# Patient Record
Sex: MALE | Employment: OTHER | URBAN - METROPOLITAN AREA
[De-identification: names, ages, dates, MRNs, and addresses within clinical notes are randomized per-mention and may not be internally consistent; named-entity substitution may affect disease eponyms.]

---

## 2020-01-29 ENCOUNTER — TELEPHONE (OUTPATIENT)
Dept: CARDIOLOGY | Facility: CLINIC | Age: 70
End: 2020-01-29

## 2020-01-29 NOTE — TELEPHONE ENCOUNTER
"Received message from Clinic  of referral to Dr. Mtz.  Spoke to patient directly.  He has a history of MI and multiple stent procedures both pre and post CABG.  Stated that he does not have symptoms when his stents are closed he "just has a heart attack".  His MD in [place], who in now retiring, will directly admit him and do cath.  Patient has copy of most recent angiogram on disc and will mail it to .  Once received and reviewed will contact patient to schedule consult.  Patient verbalized understanding.  Office mailing address and phone number provided.   "

## 2020-02-17 ENCOUNTER — OFFICE VISIT (OUTPATIENT)
Dept: CARDIOLOGY | Facility: CLINIC | Age: 70
End: 2020-02-17
Payer: MEDICARE

## 2020-02-17 VITALS
HEART RATE: 57 BPM | DIASTOLIC BLOOD PRESSURE: 65 MMHG | HEIGHT: 72 IN | BODY MASS INDEX: 25.08 KG/M2 | SYSTOLIC BLOOD PRESSURE: 139 MMHG | WEIGHT: 185.19 LBS

## 2020-02-17 DIAGNOSIS — Z95.5 HISTORY OF CORONARY ARTERY STENT PLACEMENT: ICD-10-CM

## 2020-02-17 DIAGNOSIS — Z95.1 S/P CABG (CORONARY ARTERY BYPASS GRAFT): ICD-10-CM

## 2020-02-17 DIAGNOSIS — E11.9 TYPE 2 DIABETES MELLITUS WITHOUT COMPLICATION, WITHOUT LONG-TERM CURRENT USE OF INSULIN: ICD-10-CM

## 2020-02-17 DIAGNOSIS — I10 ESSENTIAL HYPERTENSION: ICD-10-CM

## 2020-02-17 DIAGNOSIS — I25.10 CORONARY ARTERY DISEASE INVOLVING NATIVE CORONARY ARTERY OF NATIVE HEART WITHOUT ANGINA PECTORIS: Primary | ICD-10-CM

## 2020-02-17 DIAGNOSIS — E78.2 MIXED HYPERLIPIDEMIA: ICD-10-CM

## 2020-02-17 PROCEDURE — 99204 OFFICE O/P NEW MOD 45 MIN: CPT | Mod: S$PBB,,, | Performed by: INTERNAL MEDICINE

## 2020-02-17 PROCEDURE — 99204 PR OFFICE/OUTPT VISIT, NEW, LEVL IV, 45-59 MIN: ICD-10-PCS | Mod: S$PBB,,, | Performed by: INTERNAL MEDICINE

## 2020-02-17 PROCEDURE — 99999 PR PBB SHADOW E&M-EST. PATIENT-LVL III: CPT | Mod: PBBFAC,,, | Performed by: INTERNAL MEDICINE

## 2020-02-17 PROCEDURE — 99213 OFFICE O/P EST LOW 20 MIN: CPT | Mod: PBBFAC | Performed by: INTERNAL MEDICINE

## 2020-02-17 PROCEDURE — 99999 PR PBB SHADOW E&M-EST. PATIENT-LVL III: ICD-10-PCS | Mod: PBBFAC,,, | Performed by: INTERNAL MEDICINE

## 2020-02-17 RX ORDER — CHLORTHALIDONE 25 MG/1
25 TABLET ORAL DAILY
COMMUNITY

## 2020-02-17 RX ORDER — METOPROLOL SUCCINATE 25 MG/1
25 TABLET, EXTENDED RELEASE ORAL DAILY
COMMUNITY

## 2020-02-17 RX ORDER — OMEPRAZOLE 20 MG/1
20 CAPSULE, DELAYED RELEASE ORAL DAILY
COMMUNITY

## 2020-02-17 RX ORDER — RAMIPRIL 10 MG/1
10 CAPSULE ORAL DAILY
COMMUNITY

## 2020-02-17 RX ORDER — CLOPIDOGREL BISULFATE 75 MG/1
75 TABLET ORAL DAILY
COMMUNITY

## 2020-02-17 RX ORDER — EZETIMIBE AND SIMVASTATIN 10; 80 MG/1; MG/1
1 TABLET ORAL NIGHTLY
COMMUNITY

## 2020-02-17 RX ORDER — NEBIVOLOL 5 MG/1
10 TABLET ORAL DAILY
COMMUNITY

## 2020-02-17 RX ORDER — FENOFIBRATE 145 MG/1
145 TABLET, FILM COATED ORAL DAILY
COMMUNITY

## 2020-02-17 RX ORDER — NAPROXEN SODIUM 220 MG/1
81 TABLET, FILM COATED ORAL DAILY
COMMUNITY

## 2020-02-17 RX ORDER — CELECOXIB 200 MG/1
200 CAPSULE ORAL
COMMUNITY

## 2020-02-17 RX ORDER — NEBIVOLOL 10 MG/1
15 TABLET ORAL DAILY
COMMUNITY

## 2020-02-17 RX ORDER — ATORVASTATIN CALCIUM 40 MG/1
40 TABLET, FILM COATED ORAL DAILY
COMMUNITY

## 2020-02-17 NOTE — ASSESSMENT & PLAN NOTE
-CAD history outlined above  -failed non invasive surveillance in the past requiring surveillance LHCs  -on GDMT with ASA, statin, plavix, beta blocker, and statin therapies  -recommend establishment with local Cardiologist, Dr. Moctezuma  -will plan LHC in [place], [place] 2/27/2020

## 2020-02-17 NOTE — ASSESSMENT & PLAN NOTE
-chronic  -HgA1C ranging 6-6.3 per OSH records  -on jardiance therapy at home   -further monitoring and management per PCP

## 2020-02-17 NOTE — LETTER
February 17, 2020      Romeo Cerda Jr., MD  [address] [place] [id number]           [facility]  [address]  [facility] [address]  Phone: [phone]          Patient: Claude Cavazos   MR Number: [id number]   YOB: 1950   Date of Visit: 2/17/2020       Dear Dr. Romeo Cerda Jr.:    Thank you for referring Claude Cavazos to me for evaluation. Attached you will find relevant portions of my assessment and plan of care.    If you have questions, please do not hesitate to call me. I look forward to following Claude Cavazos along with you.    Sincerely,    Shawn Mtz MD    Enclosure  CC:  No Recipients    If you would like to receive this communication electronically, please contact [email or web address] or [phone] to request more information on [employer] [facility] access.    For providers and/or their staff who would like to refer a patient to [facility], please contact us through our one-stop-shop provider referral line, [facility] , at [phone].    If you feel you have received this communication in error or would no longer like to receive these types of communications, please e-mail [email or web address]

## 2020-02-17 NOTE — PROGRESS NOTES
Subjective:    Patient ID:  Claude Cavazos is a 69 y.o. male who presents for consult of known CAD. Was followed by Cardiologist Dr. Sushil Sanches in [place] since 1988 (now retired) and would like to establish care here.     Referring Provider: Dr. Romeo Cerda    HPI Mr. Cavazos is a 69 year old male with past medical history of CAD s/p CABG in 1988 with 3 bypasses and PCI in 2000 due to acute MI with complaints of epigastric pain that progressed to bilateral arm and jaw pain with chest heaviness. For a few years post MI he had annual stress testing that failed to predict restenosis which lead to surveillance angiograms every 3-4 years that required intervention in  50% of LHCs.     He had LHC with Dr. Sanches in June 2013 that noted patent SVG-LAD, SVG to LCA, and CX stents with inferobasilar hypokinesia and EF 50% and another LHC by Dr. Sanches in April 2016 which noted plaquing and ectasia of both vein grafts and 80% proximal stenosis of D2 with recommendations for optimal medical therapy.     He denies chest pain or associated symptoms, shortness of breath, ARREDONDO, PND, orthopnea, or LE swelling. 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 He is independent in ADLs, uses no ambulatory aides, and is moderately active. He and spouse exercise three times a week, walks on treadmill for about 30 minutes or 1 3/4 miles; he adds incline on occasion which does produce shortness of breath that resolves after rest.     NYHA I, CCS 0    Past Medical History:   Diagnosis Date    Acute coronary syndrome     Cancer     Hypertension      Past Surgical History:   Procedure Laterality Date    CORONARY ARTERY BYPASS GRAFT  1988    PERCUTANEOUS CORONARY INTERVENTION (PCI) FOR CHRONIC TOTAL OCCLUSION OF CORONARY ARTERY  2000       Current Outpatient Medications:     aspirin 81 MG Chew, Take 81 mg by mouth once daily., Disp: , Rfl:     atorvastatin (LIPITOR) 40 MG tablet, Take 40 mg by  mouth once daily., Disp: , Rfl:     celecoxib (CELEBREX) 200 MG capsule, Take 200 mg by mouth., Disp: , Rfl:     chlorthalidone (HYGROTEN) 25 MG Tab, Take 25 mg by mouth once daily., Disp: , Rfl:     clopidogreL (PLAVIX) 75 mg tablet, Take 75 mg by mouth once daily., Disp: , Rfl:     ezetimibe-simvastatin 10-80 mg (VYTORIN) 10-80 mg per tablet, Take 1 tablet by mouth every evening., Disp: , Rfl:     fenofibrate (TRICOR) 145 MG tablet, Take 145 mg by mouth once daily., Disp: , Rfl:     metoprolol succinate (TOPROL-XL) 25 MG 24 hr tablet, Take 25 mg by mouth once daily., Disp: , Rfl:     nebivolol (BYSTOLIC) 10 MG Tab, Take 10 mg by mouth once daily., Disp: , Rfl:     nebivolol (BYSTOLIC) 5 MG Tab, Take 10 mg by mouth once daily., Disp: , Rfl:     omeprazole (PRILOSEC) 20 MG capsule, Take 20 mg by mouth once daily., Disp: , Rfl:     ramipril (ALTACE) 10 MG capsule, Take 10 mg by mouth once daily., Disp: , Rfl:     Vitals:    02/17/20 0852   BP: 139/65   Pulse: (!) 57     Review of Systems   Constitution: Negative for chills, decreased appetite, malaise/fatigue, night sweats, weight gain and weight loss.   Cardiovascular: Negative for chest pain, dyspnea on exertion, leg swelling, near-syncope, orthopnea, palpitations and paroxysmal nocturnal dyspnea.   Respiratory: Negative for shortness of breath, sleep disturbances due to breathing and sputum production.    Musculoskeletal: Negative for arthritis, back pain and falls.   Gastrointestinal: Negative for bloating, abdominal pain, constipation, diarrhea, nausea and vomiting.   Neurological: Positive for headaches (chronic). Negative for dizziness and weakness.        Objective:    Physical Exam   Constitutional: He is oriented to person, place, and time. He appears well-developed and well-nourished. No distress.   HENT:   Head: Normocephalic and atraumatic.   Mouth/Throat: No oropharyngeal exudate.   Eyes: Conjunctivae and EOM are normal. No scleral icterus.    Neck: Normal range of motion. Neck supple. No JVD present.   Cardiovascular: Normal rate, regular rhythm and normal heart sounds.   No murmur heard.  Pulmonary/Chest: Effort normal and breath sounds normal. No respiratory distress. He has no wheezes.   Abdominal: Soft. Bowel sounds are normal. He exhibits no distension. There is no tenderness.   Musculoskeletal: Normal range of motion. He exhibits no edema.   Neurological: He is alert and oriented to person, place, and time.   Skin: Skin is warm and dry. No rash noted. He is not diaphoretic. No erythema.   Psychiatric: He has a normal mood and affect. His behavior is normal. Judgment normal.   Nursing note and vitals reviewed.        Assessment:       1. Coronary artery disease involving native coronary artery of native heart without angina pectoris    2. S/P CABG (coronary artery bypass graft)    3. History of coronary artery stent placement    4. Essential hypertension    5. Mixed hyperlipidemia    6. Type 2 diabetes mellitus without complication, without long-term current use of insulin         Plan:       Coronary artery disease involving native coronary artery of native heart without angina pectoris  -CAD history outlined above  -failed non invasive surveillance in the past requiring surveillance LHCs and interventions  -on GDMT with ASA, statin, plavix, beta blocker, and statin therapies  -recommend establishment with local Cardiologist, Dr. Moctezuma  -will plan LHC in [place], [place] 2/27/2020    S/P CABG (coronary artery bypass graft)  -see CAD and HPI    History of coronary artery stent placement  -see CAD and HPI    Essential hypertension  -chronic  -on home chlorthalidone, bystolic, and ramipril     Mixed hyperlipidemia  -chronic  -on statin therapy  -PCP monitors closely, further monitoring and management per PCP     DM type 2 (diabetes mellitus, type 2)  -chronic  -HgA1C ranging 6-6.3 per OSH records  -on jardiance therapy at home   -further monitoring and  "management per PCP       Elaina Urbina, DNP, AG-ACNP, BC  Interventional Cardiology  [facility]    Staff:  I have personally taken the history and examined this patient and agree with the fellow's note as stated above and amended it accordingly :-) His physician at [facility] in [place] has retired so he is here to establish care.    This is an unusual case.  He had CABG for "silent ischemia" but several years after CABG he had an MI with typical severe substernal chest discomfort radiating to his neck and shortness of breath.  He was treated with tPA in [place] and sent to [place] for angioplasty and stenting.  This was several weeks after a normal stress test.    Because stress testing has traditionally not predicted his coronary disease, the physician in [place] for cathing him every 3-4 years.  Half the time they placed stents.  I have agreed to follow him with angiograms every 3-4 years and have made a do with him that he will establish care with a cardiologist in [place] in case he has an acute problem.  I have recommended Dr. Doc Moctezuma  and have spoken with his nurse Denisse will arrange angiography at [facility] by me with admission and discharge by Dr. Moctezuma's [facility] next Friday.        "

## 2020-02-27 ENCOUNTER — OUTSIDE PLACE OF SERVICE (OUTPATIENT)
Dept: CARDIOLOGY | Facility: CLINIC | Age: 70
End: 2020-02-27